# Patient Record
Sex: FEMALE | Race: OTHER
[De-identification: names, ages, dates, MRNs, and addresses within clinical notes are randomized per-mention and may not be internally consistent; named-entity substitution may affect disease eponyms.]

---

## 2021-02-23 ENCOUNTER — HOSPITAL ENCOUNTER (EMERGENCY)
Dept: HOSPITAL 41 - JD.ED | Age: 61
Discharge: HOME | End: 2021-02-23
Payer: MEDICARE

## 2021-02-23 DIAGNOSIS — I11.0: ICD-10-CM

## 2021-02-23 DIAGNOSIS — F19.239: Primary | ICD-10-CM

## 2021-02-23 DIAGNOSIS — I50.9: ICD-10-CM

## 2021-02-23 DIAGNOSIS — Z87.891: ICD-10-CM

## 2021-02-23 DIAGNOSIS — Z88.8: ICD-10-CM

## 2021-02-23 DIAGNOSIS — J44.9: ICD-10-CM

## 2021-02-23 DIAGNOSIS — I25.2: ICD-10-CM

## 2021-02-23 PROCEDURE — 99283 EMERGENCY DEPT VISIT LOW MDM: CPT

## 2021-02-23 NOTE — EDM.PDOC
ED HPI GENERAL MEDICAL PROBLEM





- General


Chief Complaint: Abdominal Pain


Stated Complaint: DIZZY/VOMITING/SWEATS


Time Seen by Provider: 02/23/21 16:15


Source of Information: Reports: Patient, RN Notes Reviewed


History Limitations: Reports: No Limitations





- History of Present Illness


INITIAL COMMENTS - FREE TEXT/NARRATIVE: 





Patient is a 60-year-old female who presents to the ED for the evaluation of her

dizziness/vomiting/sweating.  Patient notes that she was on 150 mg of 

venlafaxine prescribed by Dr. Contreras, and noted she was having some adverse side 

effects like bad dreams, so she stopped taking this medication 5 days ago cold 

turkey.  These are Medication so She Did Not Cut Them in Half.  Patient Notes 

That Today Now She Is Just Not Really Feeling Well, She Is Feeling Nauseous Had 

Some Vomiting Episodes Feeling Slightly Dizzy, She Called [facility] to See If They Could Give Her a Prescription for Nausea Meds, but They 

Told Her to Come to the ER to Be Evaluated.  Patient Does Have a History of 

COPD, CHF, and a Heart Attack in 2019.  Patient's states she is not having any 

chest pain, no fevers or chills, cough or shortness of breath.  She was scared 

to eat because she thought maybe she would vomit the food back up.





- Related Data


                                    Allergies











Allergy/AdvReac Type Severity Reaction Status Date / Time


 


sacubitril [From Entresto] Allergy Severe Facial Verified 02/23/21 16:15





   Swelling  


 


valsartan [From Entresto] Allergy Severe Facial Verified 02/23/21 16:15





   Swelling  











Home Meds: 


                                    Home Meds





Ondansetron [Zofran ODT] 4 mg PO Q8H PRN #15 tab.dis 02/23/21 [Rx]











Past Medical History


Cardiovascular History: Reports: Heart Failure, Hypertension, MI (2019)


Respiratory History: Reports: COPD, Other (See Below)


Other Respiratory History: lung nodule


Musculoskeletal History: Reports: Osteoarthritis


Psychiatric History: Reports: Anxiety, Depression





Social & Family History





- Tobacco Use


Tobacco Use Status *Q: Former Tobacco User


Used Tobacco, but Quit: Yes


Month/Year Tobacco Last Used: 2019





- Recreational Drug Use


Recreational Drug Use: No





ED ROS GENERAL





- Review of Systems


Review Of Systems: Comprehensive ROS is negative, except as noted in HPI.





ED EXAM, GENERAL





- Physical Exam


Exam: See Below


Exam Limited By: No Limitations


General Appearance: Alert, WD/WN, No Apparent Distress, Anxious (generalized and

 very minimal)


Respiratory/Chest: No Respiratory Distress, Lungs Clear, Normal Breath Sounds, 

No Accessory Muscle Use, Chest Non-Tender


Cardiovascular: Normal Peripheral Pulses, Regular Rate, Rhythm, No Edema


Peripheral Pulses: 2+: Radial (L), Radial (R)


GI/Abdominal: Normal Bowel Sounds, Soft, Non-Tender, No Distention, No Mass


Extremities: Normal Inspection, Normal Capillary Refill


Neurological: Alert, Oriented, Normal Cognition, No Motor/Sensory Deficits


Psychiatric: Normal Affect, Normal Mood


Skin Exam: Warm, Dry, Intact, Normal Color, No Rash





Course





- Vital Signs


Last Recorded V/S: 


                                Last Vital Signs











Temp  97.6 F   02/23/21 16:12


 


Pulse  90   02/23/21 16:12


 


Resp  16   02/23/21 16:12


 


BP  177/108 H  02/23/21 16:12


 


Pulse Ox  97   02/23/21 16:12














- Orders/Labs/Meds


Meds: 


Medications














Discontinued Medications














Generic Name Dose Route Start Last Admin





  Trade Name Madonna  PRN Reason Stop Dose Admin


 


Ondansetron HCl  4 mg  02/23/21 16:30  02/23/21 16:58





  Zofran Odt  PO  02/23/21 16:31  4 mg





  ONETIME ONE   Administration














- Re-Assessments/Exams


Free Text/Narrative Re-Assessment/Exam: 





02/23/21 16:54


Patient presents to the ED for the evaluation of her nausea, dizziness, and 

sweating.  I do highly suspect that she is having some withdrawals from the 

venlafaxine medication, I did explain to her that she should call Dr. Osborne to try

 to figure out a different medication that would work better for her if she was 

having adverse effects.  She notes that she was taking the venlafaxine for 

roughly 2 weeks before she did want to take it anymore due to the intense 

nightmares she was having.





02/23/21 17:23


Patient states that she is feeling better after the Zofran, we will go ahead and

 get her discharged.











Departure





- Departure


Time of Disposition: 17:23


Disposition: Home, Self-Care 01


Condition: Good


Clinical Impression: 


Medication withdrawal


Qualifiers:


 Substance type: other psychoactive substance Qualified Code(s): F19.939 - Other

 psychoactive substance use, unspecified with withdrawal, unspecified








- Discharge Information


*PRESCRIPTION DRUG MONITORING PROGRAM REVIEWED*: No


*COPY OF PRESCRIPTION DRUG MONITORING REPORT IN PATIENT [place]: No


Prescriptions: 


Ondansetron [Zofran ODT] 4 mg PO Q8H PRN #15 tab.dis


 PRN Reason: Nausea


Instructions:  Nausea and Vomiting, Adult, Easy-to-Read


Referrals: 


PCP,None [Primary Care Provider] - 


Forms:  ED Department Discharge


Additional Instructions: 


You were seen in this ER today for your nausea/dizziness.





This is thought likely to be due to the withdrawal from the venlafaxine 

medication that you stop taking roughly 5 days ago.





You were given a dose of oral Zofran in the ER, this seemed to help your 

symptoms quite a bit.  You were given a few tablets of this for the next few 

days, until you can get an appointment with Dr. Contreras to be evaluated for 

medication purposes.





Recommend you stick to somewhat of a clear liquid diet over the next few days if

you are feeling nauseous, fluids like Gatorade/Powerade or Pedialyte would be 

sufficient to get some electrolytes rather than just free water.





Please return to the ER at any time if your symptoms change or worsen.





Sepsis Event Note (ED)





- Evaluation


Sepsis Screening Result: No Definite Risk





- Focused Exam


Vital Signs: 


                                   Vital Signs











  Temp Pulse Resp BP Pulse Ox


 


 02/23/21 16:12  97.6 F  90  16  177/108 H  97